# Patient Record
Sex: MALE | Race: WHITE | NOT HISPANIC OR LATINO | Employment: FULL TIME | URBAN - METROPOLITAN AREA
[De-identification: names, ages, dates, MRNs, and addresses within clinical notes are randomized per-mention and may not be internally consistent; named-entity substitution may affect disease eponyms.]

---

## 2020-07-06 ENCOUNTER — OFFICE VISIT (OUTPATIENT)
Dept: URGENT CARE | Facility: CLINIC | Age: 60
End: 2020-07-06
Payer: OTHER GOVERNMENT

## 2020-07-06 VITALS
OXYGEN SATURATION: 96 % | HEART RATE: 120 BPM | DIASTOLIC BLOOD PRESSURE: 82 MMHG | TEMPERATURE: 97 F | SYSTOLIC BLOOD PRESSURE: 140 MMHG

## 2020-07-06 DIAGNOSIS — Z20.822 SUSPECTED COVID-19 VIRUS INFECTION: Primary | ICD-10-CM

## 2020-07-06 DIAGNOSIS — R68.83 CHILLS: ICD-10-CM

## 2020-07-06 DIAGNOSIS — R50.9 FEVER: ICD-10-CM

## 2020-07-06 DIAGNOSIS — M79.10 MUSCLE PAIN: ICD-10-CM

## 2020-07-06 DIAGNOSIS — T14.8XXA MUSCLE STRAIN: ICD-10-CM

## 2020-07-06 DIAGNOSIS — R10.9 FLANK PAIN: ICD-10-CM

## 2020-07-06 LAB
BILIRUB UR QL STRIP: POSITIVE
GLUCOSE UR QL STRIP: NEGATIVE
KETONES UR QL STRIP: POSITIVE
LEUKOCYTE ESTERASE UR QL STRIP: NEGATIVE
PH, POC UA: 5 (ref 5–8)
POC BLOOD, URINE: NEGATIVE
POC NITRATES, URINE: NEGATIVE
PROT UR QL STRIP: POSITIVE
SP GR UR STRIP: 1.02 (ref 1–1.03)
UROBILINOGEN UR STRIP-ACNC: ABNORMAL (ref 0.3–2.2)

## 2020-07-06 PROCEDURE — 81003 URINALYSIS AUTO W/O SCOPE: CPT | Mod: QW,S$GLB,, | Performed by: FAMILY MEDICINE

## 2020-07-06 PROCEDURE — 99203 PR OFFICE/OUTPT VISIT, NEW, LEVL III, 30-44 MIN: ICD-10-PCS | Mod: TIER,25,S$GLB, | Performed by: FAMILY MEDICINE

## 2020-07-06 PROCEDURE — 81003 POCT URINALYSIS, DIPSTICK, AUTOMATED, W/O SCOPE: ICD-10-PCS | Mod: QW,S$GLB,, | Performed by: FAMILY MEDICINE

## 2020-07-06 PROCEDURE — 99203 OFFICE O/P NEW LOW 30 MIN: CPT | Mod: TIER,25,S$GLB, | Performed by: FAMILY MEDICINE

## 2020-07-06 PROCEDURE — U0003 INFECTIOUS AGENT DETECTION BY NUCLEIC ACID (DNA OR RNA); SEVERE ACUTE RESPIRATORY SYNDROME CORONAVIRUS 2 (SARS-COV-2) (CORONAVIRUS DISEASE [COVID-19]), AMPLIFIED PROBE TECHNIQUE, MAKING USE OF HIGH THROUGHPUT TECHNOLOGIES AS DESCRIBED BY CMS-2020-01-R: HCPCS

## 2020-07-06 RX ORDER — METFORMIN HYDROCHLORIDE 500 MG/1
500 TABLET ORAL
COMMUNITY

## 2020-07-06 RX ORDER — OMEPRAZOLE 40 MG/1
CAPSULE, DELAYED RELEASE ORAL
COMMUNITY
Start: 2020-06-26

## 2020-07-06 RX ORDER — HYDROCHLOROTHIAZIDE 25 MG/1
25 TABLET ORAL
COMMUNITY

## 2020-07-06 RX ORDER — LISINOPRIL 40 MG/1
40 TABLET ORAL
COMMUNITY

## 2020-07-06 RX ORDER — AMLODIPINE BESYLATE 5 MG/1
TABLET ORAL
COMMUNITY
Start: 2020-06-26

## 2020-07-06 NOTE — PATIENT INSTRUCTIONS
Instructions for Patients with Confirmed or Suspected COVID-19    If you are awaiting your test result, you will either be called or it will be released to the patient portal.  If you have any questions about your test, please visit [email or web address] or call our COVID-19 information line at [phone].      Preventing the Spread of Coronavirus Disease 2019 (COVID-19) in Homes and Residential Communities -- Patients     Prevention steps for people with confirmed or suspected COVID-19 (including persons under investigation) who do not need to be hospitalized and people with confirmed COVID-19 who were hospitalized and determined to be medically stable to go home.      Stay home except to get medical care.    Separate yourself from other people and animals in your home.    Call ahead before visiting your doctor.    Wear a face mask.    Cover your coughs and sneezes.    Clean your hands often.    Avoid sharing personal household items.    Clean all high-touch surfaces every day.    Monitor your symptoms. Seek prompt medical attention if your illness is worsening (e.g., difficulty breathing). Before seeking care, call your healthcare provider.    If you have a medical emergency and must call 911, notify the dispatcher that you have or are being evaluated for COVID-19. If possible, put on a face mask before emergency medical services arrive.    Use the following symptom-based strategy to return to normal activity following a suspected or confirmed case of COVID-19. Continue isolation until:   o At least 3 days (72 hours) have passed since recovery defined as resolution of fever without the use of fever-reducing medications and improvement in respiratory symptoms (e.g. cough, shortness of breath), and   o At least 10 days have passed since symptoms first appeared.     Precautions for household members, intimate partners and caregivers in a non-healthcare setting of a patient with symptomatic  laboratory-confirmed COVID-19 or a patient under investigation.     Household members, intimate partners and caregivers in a non-healthcare setting may have close contact with a person with symptomatic, laboratory-confirmed COVID-19 or a person under investigation. Close contacts should monitor their health; they should call their healthcare provider right away if they develop symptoms suggestive of COVID-19 (e.g., fever, cough, shortness of breath). Close contacts should also follow these recommendations:     · Stay home for the duration of the time recommended by healthcare provider, except to get medical care. Separate yourself from other people and animals in the home.  · Monitor the patients symptoms. If the patient is getting sicker, call his or her healthcare provider. If the patient has a medical emergency and you need to call 911, notify the dispatch personnel that the patient has or is being evaluated for COVID-19.   · Wear a facemask when around other people such as sharing a room or vehicle and before entering a healthcare provider's office.  · Cover coughs and sneezes with a tissue. Throw used tissues in a lined trash can immediately and wash hands.  · Clean hands often with soap and water for at least 20 seconds or with an alcohol-based hand , rubbing hands together until they feel dry. Avoid touching your eyes, nose, and mouth with unwashed hands.  · Clean all high-touch; surfaces every day, including counters, tabletops, doorknobs, bathroom fixtures, toilets, phones, keyboards, tablets, bedside tables, etc. Use a household cleaning spray or wipe according to label instructions.  · Avoid sharing personal household items such as dishes, drinking glasses, cups, towels, bedding, etc. After these items are used, they should be washed thoroughly with soap and water.  · Use the following symptom-based strategy to return to normal activity following a suspected or confirmed case of COVID-19.  Continue isolation until:   · At least 3 days (72 hours) have passed since recovery defined as resolution of fever without the use of fever-reducing medications and improvement in respiratory symptoms (e.g. cough, shortness of breath), and   At least 10 days have passed since symptoms first appeared.  Muscle Strain in the Abdomen  A muscle strain is a stretching or tearing of the muscle fibers. It is also called a pulled muscle. The abdomen is protected by a thick wall of muscle in the front and sides. These muscles help with twisting and bending forward. Too much coughing, lifting heavy objects, or sudden jerking movements can sometimes cause a muscle strain in the abdomen. This causes pain that is worse when you move. The area may also feel tender or look swollen and bruised.  Home care  Apply an ice pack over the injured area for 15 to 20 minutes every 3 to 6 hours. You should do this for the first 24 to 48 hours. You can make an ice pack by filling a plastic bag that seals at the top with ice cubes and then wrapping it with a thin towel. Be careful not to injure your skin with the ice treatments. Ice should never be applied directly to skin. Continue the use of ice packs for relief of pain and swelling as needed. After 48 hours, apply heat (warm shower or warm bath) for 15 to 20 minutes several times a day, or alternate ice and heat.  You may use over-the-counter pain medicine to control pain, unless another pain medicine was prescribed. If you have liver or kidney disease, a stomach ulcer or GI bleeding, talk with your healthcare provider before using these medicines.  Follow-up care  Follow up with your healthcare provider, or as advised.  Call 911  Call 911 if you have:  Weakness, lightheaded, or faint  Chest pain  When to seek medical advice  Call your healthcare provider right away if any of these occur:  Pain gets worse or moves to the right lower abdomen, just below the waistline  Fever of 100.4°F (38°C)  or above lasting for 24 to 48 hours  Vomiting  Severe abdominal pain that spreads to the back or toward the groin  Blood in the urine  Unexpected vaginal bleeding in women  Date Last Reviewed: 11/19/2015  © 8354-6347 [employer]. [address] [place] [address], [place] [address]. All rights reserved. This information is not intended as a substitute for professional medical care. Always follow your healthcare professional's instructions.          WE WILL CALL IN 4-6 DAYS WITH ANY POSITIVE RESULT, OR WE SEE YOU HAVE NOT VIEWED ON THE PATIENT PORTAL.      WITH ANY UNCONTROLLED FEVER OR SHORTNESS OF BREATH OR WORSENING SYMPTOMS, HAVE A LOW THRESHOLD FOR GOING TO THE EMERGENCY ROOM FOR FURTHER EVALUATION AND TREATMENT

## 2020-07-06 NOTE — PROGRESS NOTES
Subjective:       Patient ID: Chris Multani Jr. is a 59 y.o. male.    Vitals:  temperature is 97.2 °F (36.2 °C). His blood pressure is 140/82 (abnormal) and his pulse is 120 (abnormal). His oxygen saturation is 96%.     Chief Complaint: Fever    59-year-old male with 2 week history of unilateral flank pain.  He states this started after doing some yoga stretches at home.  More recently he started with chills, sweating, fatigue and noticed a fever this morning up to 103.7 with some mild shortness of breath.  Occasional cough.  No known exposures to COVID.  No history of nephrolithiasis.    Fever   This is a new problem. The current episode started today. The problem occurs intermittently. The problem has been unchanged. The temperature was taken using an oral thermometer. Pertinent negatives include no congestion, coughing, ear pain, nausea, rash, sore throat, vomiting or wheezing. He has tried acetaminophen and NSAIDs for the symptoms. The treatment provided significant relief.       Constitution: Positive for chills, sweating, fatigue and fever.   HENT: Negative for ear pain, congestion, sinus pain, sinus pressure, sore throat and voice change.    Neck: Negative for painful lymph nodes.   Eyes: Negative for eye redness.   Respiratory: Positive for chest tightness and shortness of breath. Negative for cough, sputum production, bloody sputum, COPD, stridor, wheezing and asthma.    Gastrointestinal: Negative for nausea and vomiting.   Musculoskeletal: Positive for back pain and muscle ache.   Skin: Negative for rash.   Allergic/Immunologic: Negative for seasonal allergies and asthma.   Hematologic/Lymphatic: Negative for swollen lymph nodes.       Objective:      Physical Exam   Constitutional: He is oriented to person, place, and time. He appears well-developed.  Non-toxic appearance. He does not appear ill. No distress.   HENT:   Head: Normocephalic and atraumatic.   Right Ear: External ear normal.   Left Ear:  External ear normal.   Mouth/Throat: Oropharynx is clear and moist. No oropharyngeal exudate.   Eyes: Pupils are equal, round, and reactive to light. Conjunctivae and EOM are normal.   Neck: Normal range of motion.   Cardiovascular: Normal rate, regular rhythm and normal heart sounds.   Pulmonary/Chest: Effort normal and breath sounds normal. No stridor. No respiratory distress. He has no wheezes. He has no rhonchi. He has no rales.   Abdominal: Bowel sounds are normal. He exhibits no distension and no mass. There is no abdominal tenderness. There is no rebound and no guarding.   Musculoskeletal:      Comments: Unilateral flank pain is reproduced with lateral thoracic rotation and stretching   Lymphadenopathy:     He has no cervical adenopathy.   Neurological: He is alert and oriented to person, place, and time.   Skin: Skin is warm, dry and not diaphoretic.   Psychiatric: His behavior is normal. Judgment and thought content normal.   Vitals reviewed.    Results for orders placed or performed in visit on 07/06/20   POCT Urinalysis, Dipstick, Automated, W/O Scope   Result Value Ref Range    POC Blood, Urine Negative Negative    POC Bilirubin, Urine Positive (A) Negative    POC Urobilinogen, Urine n 0.3 - 2.2    POC Ketones, Urine Positive (A) Negative    POC Protein, Urine Positive (A) Negative    POC Nitrates, Urine Negative Negative    POC Glucose, Urine Negative Negative    pH, UA 5.0 5 - 8    POC Specific Gravity, Urine 1.025 1.003 - 1.029    POC Leukocytes, Urine Negative Negative         Assessment:       1. Suspected Covid-19 Virus Infection    2. Flank pain    3. Muscle strain        Plan:         Suspected Covid-19 Virus Infection    Flank pain  -     POCT Urinalysis, Dipstick, Automated, W/O Scope    Muscle strain    WE WILL CALL IN 4-6 DAYS WITH ANY POSITIVE RESULT, OR WE SEE YOU HAVE NOT VIEWED ON THE PATIENT PORTAL.      WITH ANY UNCONTROLLED FEVER OR SHORTNESS OF BREATH OR WORSENING SYMPTOMS, HAVE A LOW  THRESHOLD FOR GOING TO THE EMERGENCY ROOM FOR FURTHER EVALUATION AND TREATMENT

## 2020-07-07 LAB — SARS-COV-2 RNA RESP QL NAA+PROBE: NOT DETECTED

## 2020-07-08 ENCOUNTER — TELEPHONE (OUTPATIENT)
Dept: URGENT CARE | Facility: CLINIC | Age: 60
End: 2020-07-08

## 2020-07-08 NOTE — TELEPHONE ENCOUNTER
----- Message from Sagrario Reaves NP sent at 7/8/2020  9:21 AM CDT -----  Please call this patient and notify of her NEGATIVE COVID-19 Results   Thank you.